# Patient Record
Sex: MALE | Race: WHITE
[De-identification: names, ages, dates, MRNs, and addresses within clinical notes are randomized per-mention and may not be internally consistent; named-entity substitution may affect disease eponyms.]

---

## 2020-05-04 ENCOUNTER — HOSPITAL ENCOUNTER (EMERGENCY)
Dept: HOSPITAL 50 - VM.ED | Age: 60
Discharge: NURSING, REHAB OR LONG-TERM CARE | End: 2020-05-04
Payer: MEDICARE

## 2020-05-04 VITALS — SYSTOLIC BLOOD PRESSURE: 111 MMHG | HEART RATE: 88 BPM | DIASTOLIC BLOOD PRESSURE: 72 MMHG

## 2020-05-04 DIAGNOSIS — Z86.73: ICD-10-CM

## 2020-05-04 DIAGNOSIS — Z79.01: ICD-10-CM

## 2020-05-04 DIAGNOSIS — J18.9: Primary | ICD-10-CM

## 2020-05-04 DIAGNOSIS — Z79.899: ICD-10-CM

## 2020-05-04 LAB
ANION GAP SERPL CALC-SCNC: 15.1 MMOL/L (ref 10–20)
CHLORIDE SERPL-SCNC: 104 MMOL/L (ref 98–107)
SODIUM SERPL-SCNC: 142 MMOL/L (ref 136–145)

## 2020-05-04 NOTE — EDM.PDOC
ED HPI GENERAL MEDICAL PROBLEM





- General


Chief Complaint: General


Stated Complaint: Sent by her nursing home due to decreased lethargy 

questionable fever questionable aspiration pneumonia


Time Seen by Provider: 05/04/20 11:35


Source of Information: Reports: EMS, Nursing Home Records


History Limitations: Reports: Physical Impairment





- History of Present Illness


INITIAL COMMENTS - FREE TEXT/NARRATIVE: 





Patient has a history of CVA and is verbal answers yes or no to some questions 

but is not able to give a full history he was sent over from the nursing home 

secondary to fever 100.3 today and questionable aspiration pneumonia since last 

Thursday after vomiting x1  patient does have a history of multiple episodes of 

aspiration pneumonia.





They say today that he has been not acting like himself with more lethargy


Duration: Day(s):


Associated Symptoms: Reports: No Other Symptoms, Cough





- Related Data


 Allergies











Allergy/AdvReac Type Severity Reaction Status Date / Time


 


No Known Allergies Allergy   Verified 05/04/20 11:54











Home Meds: 


 Home Meds





Amantadine [Symmetrel] 100 mg PO BID 05/04/20 [History]


Ascorbic Acid 500 mg PO BID 05/04/20 [History]


Bisacodyl [Laxative Suppository] 10 mg RC ASDIRECTED PRN 05/04/20 [History]


Cholecalciferol (Vitamin D3) [Vitamin D3] 5,000 unit PO DAILY 05/04/20 [History]


Divalproex Sodium [Depakote] 1,000 mg PO DAILY 05/04/20 [History]


Divalproex Sodium [Depakote] 1,500 mg PO DAILY 05/04/20 [History]


Docusate Sodium [Colace] 100 mg PO BID 05/04/20 [History]


Docusate Sodium/Sennosides [Senna Plus] 1 each PO BID 05/04/20 [History]


Escitalopram Oxalate [Lexapro] 20 mg PO DAILY 05/04/20 [History]


Levothyroxine Sodium [Synthroid] 75 mcg PO ACBREAKFAST 05/04/20 [History]


Mirtazapine [Remeron] 7.5 mg PO BEDTIME 05/04/20 [History]


Multivitamin [Multi-Vitamin Daily] 1 each PO DAILY 05/04/20 [History]


OLANZapine [ZyPREXA] 10 mg PO DAILY 05/04/20 [History]


OLANZapine [ZyPREXA] 20 mg PO DAILY 05/04/20 [History]


Phenytoin 200 mg PO SUTUTHSA 05/04/20 [History]


Phenytoin Sodium Extended 200 mg PO DAILY 05/04/20 [History]


Phenytoin Sodium Extended 300 mg PO MOWEFR 05/04/20 [History]


Sodium Chloride 1,000 mg PO TID 05/04/20 [History]


Tetrahydrozoline HCl [Visine] 2 drop EYEBOTH QID PRN 05/04/20 [History]


Vitamin B Complex/Folic Acid [Vitamin B Complex Tablet] 0.4 mg PO DAILY 05/04/ 20 [History]


Warfarin Sodium [Coumadin] 2.5 mg PO SUMOWETHFRSA 05/04/20 [History]


Warfarin [Coumadin] 5 mg PO TU 05/04/20 [History]


atorvaSTATin Calcium [Lipitor] 20 mg PO DAILY 05/04/20 [History]


bisacodyL [Bisacodyl] 5 mg PO ASDIRECTED PRN 05/04/20 [History]


lamoTRIgine [Lamictal] 100 mg PO DAILY 05/04/20 [History]


levETIRAcetam [Keppra] 2,500 mg PO BID 05/04/20 [History]


traZODone HCl [Trazodone HCl] 100 mg PO BEDTIME 05/04/20 [History]











ED ROS GENERAL





- Review of Systems


Review Of Systems: See Below


Constitutional: Reports: Fever, Malaise


HEENT: Reports: No Symptoms


Respiratory: Reports: Cough


Cardiovascular: Reports: No Symptoms


Endocrine: Reports: No Symptoms


GI/Abdominal: Reports: No Symptoms


: Reports: No Symptoms


Musculoskeletal: Reports: No Symptoms


Skin: Reports: No Symptoms


Neurological: Reports: No Symptoms


Psychiatric: Reports: No Symptoms


Hematologic/Lymphatic: Reports: No Symptoms


Immunologic: Reports: No Symptoms





ED EXAM, GENERAL





- Physical Exam


Exam: See Below


Exam Limited By: No Limitations


General Appearance: Alert, Other (Patient is alert will answer yes or no to 

some questions but looks like he does not feel well he has no signs or symptoms 

of respiratory distress).  No: WD/WN, No Apparent Distress


Eye Exam: Bilateral Eye: PERRL


Ears: Normal External Exam, Normal Canal, Hearing Grossly Normal, Normal TMs


Nose: Normal Inspection, Normal Mucosa, No Blood


Throat/Mouth: Normal Inspection, Normal Lips, Normal Teeth, Normal Gums, Normal 

Oropharynx, Normal Voice, No Airway Compromise, Other (Moist mucous membranes)


Head: Atraumatic, Normocephalic


Neck: Normal Inspection, Supple, Non-Tender, Full Range of Motion


Respiratory/Chest: No Respiratory Distress, Lungs Clear, Normal Breath Sounds, 

No Accessory Muscle Use, Chest Non-Tender, Other (Mild decreased air movement 

to lower lobes bilateral)


Cardiovascular: Normal Peripheral Pulses, Regular Rate, Rhythm, No Edema, No 

Gallop, No JVD


GI/Abdominal: Normal Bowel Sounds, Soft, Non-Tender, No Organomegaly, No 

Distention


Extremities: Normal Inspection, Normal Range of Motion, Non-Tender, No Pedal 

Edema, Normal Capillary Refill


Neurological: Alert, Other.  No: Oriented


Skin Exam: Warm, Dry, Intact, Normal Color, No Rash





Course





- Vital Signs


Text/Narrative:: 





CBC BMP chest x-ray blood culture x2





Vital signs stable within normal limits





[place] [address]





All other labs within normal limits





Chest x-ray mild right base infiltrate





Patient will be given clindamycin 900 mg IV and started on clindamycin 300  

p.o. q 8 hrs  x 10 days and return to the nursing home follow-up with his 

primary care provider


Last Recorded V/S: 


 Last Vital Signs











Temp  36.1 C   05/04/20 11:35


 


Pulse  88   05/04/20 11:35


 


Resp  16   05/04/20 11:35


 


BP  111/72   05/04/20 11:35


 


Pulse Ox  97   05/04/20 11:35














- Orders/Labs/Meds


Orders: 


 Active Orders 24 hr











 Category Date Time Status


 


 CULTURE BLOOD [BC] Stat Lab  05/04/20 11:47 Received


 


 Clindamycin Phosphate [Cleocin] 900 mg Med  05/04/20 12:57 Active





 Sodium Chloride 0.9% [Normal Saline] 100 ml   





 IV STAT   








 Medication Orders





Clindamycin Phosphate 900 mg/ (Sodium Chloride)  106 mls @ 200 mls/hr IV STAT 

ONE


   Stop: 05/04/20 13:28








Labs: 


 Laboratory Tests











  05/04/20 05/04/20 Range/Units





  11:47 11:47 


 


WBC  13.3 H   (4.0-10.0)  x10^3/uL


 


RBC  4.13 L   (4.5-6.0)  x10^6/uL


 


Hgb  13.6 L   (14.0-18.0)  g/dL


 


Hct  40.2   (40.0-52.0)  %


 


MCV  97.3 H   (78.0-93.0)  fL


 


MCH  32.9 H   (26.0-32.0)  pg


 


MCHC  33.8   (32.0-36.0)  g/dL


 


RDW Coeff of Dc  13.7   (10.0-15.0)  %


 


Plt Count  179   (130-400)  x10^3/uL


 


Neut % (Auto)  74.0   (50.0-80.0)  %


 


Lymph % (Auto)  15.2 L   (25.0-50.0)  %


 


Mono % (Auto)  10.3   (2.0-11.0)  %


 


Eos % (Auto)  0.3   (0.0-4.0)  %


 


Baso % (Auto)  0.2   (0.2-1.2)  %


 


Sodium   142  (136-145)  mmol/L


 


Potassium   4.1  (3.5-5.1)  mmol/L


 


Chloride   104  ()  mmol/L


 


Carbon Dioxide   27  (21-32)  mmol/L


 


Anion Gap   15.1  (10-20)  mmol/L


 


BUN   11  (7-18)  mg/dL


 


Creatinine   0.9  (0.70-1.30)  mg/dL


 


Est Cr Clr Drug Dosing   TNP  


 


Estimated GFR (MDRD)   > 60  


 


Glucose   121 H  ()  mg/dL


 


Calcium   8.8  (8.5-10.1)  mg/dL











Meds: 


Medications











Generic Name Dose Route Start Last Admin





  Trade Name Freq  PRN Reason Stop Dose Admin


 


Clindamycin Phosphate 900 mg/  106 mls @ 200 mls/hr  05/04/20 12:57  





  Sodium Chloride  IV  05/04/20 13:28  





  STAT ONE   





     





     





     





     














Discontinued Medications














Generic Name Dose Route Start Last Admin





  Trade Name Freq  PRN Reason Stop Dose Admin


 


Levofloxacin/Dextrose 750 mg/  150 mls @ 100 mls/hr  05/04/20 12:48  





  Premix  IV  05/04/20 14:17  





  ONETIME ONE   





     





     





     





     














Departure





- Departure


Time of Disposition: 12:50


Disposition: DC/Tfer to SN[facility]


Condition: Good


Clinical Impression: 


 Pneumonia








- Discharge Information


Instructions:  Community-Acquired Pneumonia, Adult


Referrals: 


Antonio Flores MD [Primary Care Provider] - 


Forms:  ED Department Discharge


Additional Instructions: 


Continue clindamycin 300 mg 1 p.o. every 8 hours x10 days





Continue to give nebulizer treatments as needed continue Tylenol treatment for 

the next 24 to 48 hours as needed





Follow-up with primary care provider in the next 24 to 48 hours





Return to the emergency room if anything gets worse or changes





Sepsis Event Note





- Focused Exam


Vital Signs: 


 Vital Signs











  Temp Pulse Resp BP Pulse Ox


 


 05/04/20 11:35  36.1 C  88  16  111/72  97











Date Exam was Performed: 05/04/20


Time Exam was Performed: 13:00





- Problem List & Annotations


(1) Pneumonia


SNOMED Code(s): 468391226


   Code(s): J18.9 - PNEUMONIA, UNSPECIFIED ORGANISM   Status: Acute   Current 

Visit: Yes   





- My Orders


Last 24 Hours: 


My Active Orders





05/04/20 11:47


CULTURE BLOOD [BC] Stat 





05/04/20 12:57


Clindamycin Phosphate [Cleocin] 900 mg   Sodium Chloride 0.9% [Normal Saline] 

100 ml IV STAT 














- Assessment/Plan


Last 24 Hours: 


My Active Orders





05/04/20 11:47


CULTURE BLOOD [BC] Stat 





05/04/20 12:57


Clindamycin Phosphate [Cleocin] 900 mg   Sodium Chloride 0.9% [Normal Saline] 

100 ml IV STAT

## 2022-02-17 NOTE — CR
Dejuan Desir, RN         ______________________________________________________________________________   

  

[id number] RAD/RAD Chest PA or AP 1V  

EXAM: FRONTAL CHEST  

   

 INDICATION: Fever with possible aspiration.  

   

 COMPARISON: None.  

   

 DISCUSSION: There is mild elevation of the right hemidiaphragm. Mild linear  

 atelectasis or scarring the lung bases and probable early right base  

 infiltrates. Normal heart size.  

   

 IMPRESSION:    

 1.  Mild right base infiltrates.  

   

 Electronically signed by Jose Tian MD on 5/4/2020 12:18 PM  

   

  

Jose Tian MD                 

 05/04/20 0602    

  

Thank you for allowing us to participate in the care of your patient.

## 2022-04-05 ENCOUNTER — HOSPITAL ENCOUNTER (EMERGENCY)
Dept: HOSPITAL 50 - VM.ED | Age: 62
Discharge: ANOTHER HOSPITAL | End: 2022-04-05
Payer: MEDICARE

## 2022-04-05 DIAGNOSIS — Z79.899: ICD-10-CM

## 2022-04-05 DIAGNOSIS — A41.9: Primary | ICD-10-CM

## 2022-04-05 DIAGNOSIS — R06.03: ICD-10-CM

## 2022-04-05 DIAGNOSIS — R00.0: ICD-10-CM

## 2022-04-05 DIAGNOSIS — Z20.822: ICD-10-CM

## 2022-04-05 DIAGNOSIS — Z86.73: ICD-10-CM

## 2022-04-05 DIAGNOSIS — K56.609: ICD-10-CM

## 2022-04-05 DIAGNOSIS — I48.91: ICD-10-CM

## 2022-04-05 DIAGNOSIS — E03.9: ICD-10-CM

## 2022-04-05 LAB
ANION GAP SERPL CALC-SCNC: 15.9 MMOL/L (ref 5–15)
CHLORIDE SERPL-SCNC: 100 MMOL/L (ref 98–107)
SARS-COV-2 RNA RESP QL NAA+PROBE: NEGATIVE
SODIUM SERPL-SCNC: 138 MMOL/L (ref 136–145)

## 2022-04-05 RX ADMIN — SODIUM CHLORIDE ONE MG: 9 INJECTION, SOLUTION INTRAVENOUS at 04:18

## 2022-04-05 RX ADMIN — SODIUM CHLORIDE ONE MG: 9 INJECTION, SOLUTION INTRAVENOUS at 01:45
